# Patient Record
Sex: FEMALE | URBAN - METROPOLITAN AREA
[De-identification: names, ages, dates, MRNs, and addresses within clinical notes are randomized per-mention and may not be internally consistent; named-entity substitution may affect disease eponyms.]

---

## 2017-07-03 ENCOUNTER — PRE VISIT (OUTPATIENT)
Dept: ORTHOPEDICS | Facility: CLINIC | Age: 32
End: 2017-07-03
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
## 2017-07-05 DIAGNOSIS — M25.512 SHOULDER PAIN, LEFT: Primary | ICD-10-CM
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
## 2017-07-06 NOTE — TELEPHONE ENCOUNTER
Spoke to Shannon at [facility] -- no related imaging on file for pt.

## 2017-08-14 ENCOUNTER — OFFICE VISIT (OUTPATIENT)
Dept: ORTHOPEDICS | Facility: CLINIC | Age: 32
End: 2017-08-14

## 2017-08-14 VITALS — WEIGHT: 293 LBS | HEIGHT: 69 IN | BODY MASS INDEX: 43.4 KG/M2

## 2017-08-14 DIAGNOSIS — S49.92XA ACROMIOCLAVICULAR (AC) JOINT INJURY, LEFT, INITIAL ENCOUNTER: Primary | ICD-10-CM

## 2017-08-14 ASSESSMENT — ENCOUNTER SYMPTOMS
MUSCLE WEAKNESS: 1
BACK PAIN: 0
ARTHRALGIAS: 0
MUSCLE CRAMPS: 1
JOINT SWELLING: 0
STIFFNESS: 0
MYALGIAS: 1
NECK PAIN: 1
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
## 2017-08-14 NOTE — PATIENT INSTRUCTIONS
Please take 2 tablets of over the counter Aleve twice a day for 2 weeks.       Please do Physical therapy for 2 months and Follow up with Dr. Cantrell.       Thanks.
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
## 2017-09-18 ENCOUNTER — THERAPY VISIT (OUTPATIENT)
Dept: PHYSICAL THERAPY | Facility: CLINIC | Age: 32
End: 2017-09-18
Payer: COMMERCIAL

## 2017-09-18 DIAGNOSIS — S49.92XD INJURY OF LEFT ACROMIOCLAVICULAR JOINT, SUBSEQUENT ENCOUNTER: Primary | ICD-10-CM

## 2017-09-18 DIAGNOSIS — M25.512 CHRONIC LEFT SHOULDER PAIN: ICD-10-CM

## 2017-09-18 DIAGNOSIS — G89.29 CHRONIC LEFT SHOULDER PAIN: ICD-10-CM

## 2017-09-18 PROCEDURE — 97110 THERAPEUTIC EXERCISES: CPT | Mod: GP | Performed by: PHYSICAL THERAPIST

## 2017-09-18 PROCEDURE — 97161 PT EVAL LOW COMPLEX 20 MIN: CPT | Mod: GP | Performed by: PHYSICAL THERAPIST
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
## 2017-10-23 ENCOUNTER — OFFICE VISIT (OUTPATIENT)
Dept: ORTHOPEDICS | Facility: CLINIC | Age: 32
End: 2017-10-23

## 2017-10-23 VITALS — BODY MASS INDEX: 43.4 KG/M2 | WEIGHT: 293 LBS | HEIGHT: 69 IN

## 2017-10-23 DIAGNOSIS — S49.92XA ACROMIOCLAVICULAR (AC) JOINT INJURY, LEFT, INITIAL ENCOUNTER: Primary | ICD-10-CM
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
## 2017-10-23 NOTE — PROGRESS NOTES
CHIEF COMPLAINT:  Left shoulder pain.      HISTORY OF PRESENT ILLNESS:  Ms. Gonzalez returns today for followup.  She notes that she has been doing pretty well with her home exercise program and her physical therapy.  Last week, she had pain for a couple of hours.  Now this pain is better than it was before her therapy program because it does not linger.  She notes that overall, she feels that she can do most things, but she is still avoiding heavy lifting.      PHYSICAL EXAMINATION:  On exam today, she has 160 degrees of forward elevation and 60 degrees of external rotation at the side.  She has 5/5 forward elevator and external rotator strength.      ASSESSMENT:  Status post nonsurgical treatment, doing well.      PLAN:  I had a nice talk with Ms. Gonzalez today.  I think it is fine for her to progress her activities as tolerated and see me back down the road should any additional problems arise.  She demonstrated a good understanding of this and I answered all of her questions.

## 2017-10-23 NOTE — LETTER
10/23/2017       RE: Myrna Gonzalez  [address]  [facility] [place] [facility] [address]     Dear Colleague,    Thank you for referring your patient, Myrna Gonzalez, to the [facility] at [facility]. Please see a copy of my visit note below.    CHIEF COMPLAINT:  Left shoulder pain.      HISTORY OF PRESENT ILLNESS:  Ms. Gonzalez returns today for followup.  She notes that she has been doing pretty well with her home exercise program and her physical therapy.  Last week, she had pain for a couple of hours.  Now this pain is better than it was before her therapy program because it does not linger.  She notes that overall, she feels that she can do most things, but she is still avoiding heavy lifting.      PHYSICAL EXAMINATION:  On exam today, she has 160 degrees of forward elevation and 60 degrees of external rotation at the side.  She has 5/5 forward elevator and external rotator strength.      ASSESSMENT:  Status post nonsurgical treatment, doing well.      PLAN:  I had a nice talk with Ms. Gonzalez today.  I think it is fine for her to progress her activities as tolerated and see me back down the road should any additional problems arise.  She demonstrated a good understanding of this and I answered all of her questions.       Again, thank you for allowing me to participate in the care of your patient.      Sincerely,    Fausto Cantrell MD

## 2017-10-23 NOTE — MR AVS SNAPSHOT
"              After Visit Summary   10/23/2017    Myrna Gonzalez    MRN: [id number]           Patient Information     Date Of Birth          1985        Visit Information        Provider Department      10/23/2017 2:00 PM Fausto Cantrell MD [facility]        Today's Diagnoses     Acromioclavicular (AC) joint injury, left, initial encounter    -  1       Follow-ups after your visit        Who to contact     Please call your clinic at [phone] to:    Ask questions about your health    Make or cancel appointments    Discuss your medicines    Learn about your test results    Speak to your doctor   If you have compliments or concerns about an experience at your clinic, or if you wish to file a complaint, please contact [facility] Patient Relations at [phone] or email us at [email or web address]         Additional Information About Your Visit        MyChart Information     St[employer]That is an electronic gateway that provides easy, online access to your medical records. With [employer]That, you can request a clinic appointment, read your test results, renew a prescription or communicate with your care team.     To sign up for [employer] visit the website at [email or web address]   You will be asked to enter the access code listed below, as well as some personal information. Please follow the directions to create your username and password.     Your access code is: [email or web address]  Expires: 2017  6:30 AM     Your access code will  in 90 days. If you need help or a new code, please contact your [facility] or call [phone] for assistance.        Care EveryWhere ID     This is your Care EveryWhere ID. This could be used by other organizations to access your [place] medical records  [id number]        Your Vitals Were     Height BMI (Body Mass Index)                1.753 m (5' 9\") 44.6 kg/m2           Blood Pressure " from Last 3 Encounters:   No data found for BP    Weight from Last 3 Encounters:   10/23/17 (!) 137 kg (302 lb)   08/14/17 (!) 139.1 kg (306 lb 9.6 oz)              Today, you had the following     No orders found for display       Primary Care Provider    None Specified       No primary provider on file.        Equal Access to Services     [facility]: Hadii nolan ku haddariuso Sopedritoali, waaxda luqadaha, qaybta kaalmada wilder, case poseytoyrosita gutierrez . So [facility] [phone].    ATENCIÓN: Si habla español, tiene a ortiz disposición servicios gratuitos de asistencia lingüística. Llame al [phone].    We comply with applicable federal civil rights laws and [place] laws. We do not discriminate on the basis of race, color, national origin, age, disability, sex, sexual orientation, or gender identity.            Thank you!     Thank you for choosing [facility]  for your care. Our goal is always to provide you with excellent care. Hearing back from our patients is one way we can continue to improve our services. Please take a few minutes to complete the written survey that you may receive in the mail after your visit with us. Thank you!             Your Updated Medication List - Protect others around you: Learn how to safely use, store and throw away your medicines at www.disposemymeds.org.          This list is accurate as of: 10/23/17 11:59 PM.  Always use your most recent med list.                   Brand Name Dispense Instructions for use Diagnosis    IBUPROFEN PO      Take by mouth as needed for moderate pain        OXYCODONE HCL PO      Take by mouth as needed